# Patient Record
Sex: MALE | Race: WHITE | Employment: OTHER | URBAN - METROPOLITAN AREA
[De-identification: names, ages, dates, MRNs, and addresses within clinical notes are randomized per-mention and may not be internally consistent; named-entity substitution may affect disease eponyms.]

---

## 2019-06-22 ENCOUNTER — APPOINTMENT (OUTPATIENT)
Dept: GENERAL RADIOLOGY | Age: 29
End: 2019-06-22
Attending: EMERGENCY MEDICINE
Payer: OTHER GOVERNMENT

## 2019-06-22 ENCOUNTER — HOSPITAL ENCOUNTER (EMERGENCY)
Age: 29
Discharge: HOME | End: 2019-06-22
Attending: EMERGENCY MEDICINE
Payer: OTHER GOVERNMENT

## 2019-06-22 VITALS
OXYGEN SATURATION: 100 % | SYSTOLIC BLOOD PRESSURE: 117 MMHG | RESPIRATION RATE: 18 BRPM | TEMPERATURE: 97.8 F | DIASTOLIC BLOOD PRESSURE: 80 MMHG | WEIGHT: 250 LBS | HEART RATE: 121 BPM | HEIGHT: 76 IN | BODY MASS INDEX: 30.44 KG/M2

## 2019-06-22 DIAGNOSIS — S42.031A CLOSED DISPLACED FRACTURE OF ACROMIAL END OF RIGHT CLAVICLE, INITIAL ENCOUNTER: Primary | ICD-10-CM

## 2019-06-22 PROCEDURE — 73030 X-RAY EXAM OF SHOULDER: CPT

## 2019-06-22 PROCEDURE — 74011250637 HC RX REV CODE- 250/637: Performed by: EMERGENCY MEDICINE

## 2019-06-22 PROCEDURE — 99283 EMERGENCY DEPT VISIT LOW MDM: CPT

## 2019-06-22 RX ORDER — OXYCODONE AND ACETAMINOPHEN 5; 325 MG/1; MG/1
1 TABLET ORAL
Qty: 12 TAB | Refills: 0 | Status: SHIPPED | OUTPATIENT
Start: 2019-06-22 | End: 2019-06-27

## 2019-06-22 RX ORDER — DIAZEPAM 10 MG/1
5 TABLET ORAL
COMMUNITY

## 2019-06-22 RX ORDER — OXYCODONE AND ACETAMINOPHEN 5; 325 MG/1; MG/1
1 TABLET ORAL ONCE
Status: COMPLETED | OUTPATIENT
Start: 2019-06-22 | End: 2019-06-22

## 2019-06-22 RX ORDER — BENAZEPRIL HYDROCHLORIDE AND HYDROCHLOROTHIAZIDE 10; 12.5 MG/1; MG/1
TABLET ORAL DAILY
COMMUNITY

## 2019-06-22 RX ADMIN — OXYCODONE HYDROCHLORIDE AND ACETAMINOPHEN 1 TABLET: 5; 325 TABLET ORAL at 08:08

## 2019-06-22 NOTE — ED PROVIDER NOTES
EMERGENCY DEPARTMENT HISTORY AND PHYSICAL EXAM    Date: 6/22/2019  Patient Name: Louise Bustamante    History of Presenting Illness     Chief Complaint   Patient presents with   [facility] Shoulder Injury         History Provided By: Patient    7:46 AM  Louise Bustamante is a 30 y.o. male with PMHX of hypertension who presents to the emergency department C/O fall on outstretched hand on the right. He states he felt a pop in his right shoulder. No other injuries. PCP: Xi, MD Kosta    Current Outpatient Medications   Medication Sig Dispense Refill    benazepril-hydroCHLOROthiazide (LOTENSIN HCT) 10-12.5 mg per tablet Take  by mouth daily.  diazePAM (VALIUM) 10 mg tablet Take 5 mg by mouth every six (6) hours as needed for Anxiety.  oxyCODONE-acetaminophen (PERCOCET) 5-325 mg per tablet Take 1 Tab by mouth every six (6) hours as needed for Pain for up to 5 days. Max Daily Amount: 4 Tabs. 12 Tab 0       Past History     Past Medical History:  Past Medical History:   Diagnosis Date    Hypertension     Psychiatric disorder        Past Surgical History:  History reviewed. No pertinent surgical history. Family History:  History reviewed. No pertinent family history. Social History:  Social History     Tobacco Use    Smoking status: Never Smoker    Smokeless tobacco: Never Used   Substance Use Topics    Alcohol use: Yes    Drug use: Not on file       Allergies:  No Known Allergies      Review of Systems   Review of Systems   Constitutional: Negative for fever. HENT: Negative for ear pain and hearing loss. Eyes: Negative for pain and visual disturbance. Respiratory: Negative for shortness of breath. Cardiovascular: Negative for chest pain. Gastrointestinal: Negative for abdominal pain. Genitourinary: Negative for difficulty urinating and dysuria. Musculoskeletal:        Right shoulder pain    Neurological: Negative for dizziness, light-headedness and headaches.        Physical Exam Vitals:    06/22/19 0635   BP: 117/80   Pulse: (!) 121   Resp: 18   Temp: 97.8 °F (36.6 °C)   SpO2: 100%   Weight: 113.4 kg (250 lb)   Height: 6' 4\" (1.93 m)     Physical Exam   Constitutional: He is oriented to person, place, and time. He appears well-developed. HENT:   Head: Normocephalic and atraumatic. Eyes: Pupils are equal, round, and reactive to light. Neck: Normal range of motion. Cardiovascular: Normal rate and regular rhythm. Pulmonary/Chest: Effort normal and breath sounds normal.   Abdominal: Soft. Musculoskeletal:        Right shoulder: He exhibits decreased range of motion. He exhibits no pain and normal pulse. Right elbow: Normal.       Right wrist: Normal.        Arms:  Neurological: He is alert and oriented to person, place, and time. Psychiatric: He has a normal mood and affect. Nursing note and vitals reviewed. Diagnostic Study Results     Labs -   No results found for this or any previous visit (from the past 12 hour(s)). Radiologic Studies - displaced fracture of collar bone distally  XR SHOULDER RT AP/LAT MIN 2 V    (Results Pending)     CT Results  (Last 48 hours)    None        CXR Results  (Last 48 hours)    None          Medications given in the ED-  Medications   oxyCODONE-acetaminophen (PERCOCET) 5-325 mg per tablet 1 Tab (1 Tab Oral Given 6/22/19 0808)         Medical Decision Making   I am the first provider for this patient. I reviewed the vital signs, available nursing notes, past medical history, past surgical history, family history and social history. Vital Signs-Reviewed the patient's vital signs. Records Reviewed: Nursing Notes       Provider Notes (Medical Decision Making): Pedrito Mcgregor is a 30 y.o. male ending after a fall on outstretched hand. He on x-ray has a displaced distal collarbone fracture. His ulnar radial and medial nerves as well as axillary nerves are intact.   He has decreased range of motion of the right shoulder but no signs of shoulder dislocation. Orthopedics was consulted. And pain medication was given. Procedures:  Procedures    ED Course:   Talk to Dr. Krysta Hook who agrees the patient should have a sling and will follow-up next week. Diagnosis and Disposition       DISCHARGE NOTE:    Chance Dougherty  results have been reviewed with him. He has been counseled regarding his diagnosis, treatment, and plan. He verbally conveys understanding and agreement of the signs, symptoms, diagnosis, treatment and prognosis and additionally agrees to follow up as discussed. He also agrees with the care-plan and conveys that all of his questions have been answered. I have also provided discharge instructions for him that include: educational information regarding their diagnosis and treatment, and list of reasons why they would want to return to the ED prior to their follow-up appointment, should his condition change. He has been provided with education for proper emergency department utilization. CLINICAL IMPRESSION:    1. Closed displaced fracture of acromial end of right clavicle, initial encounter        PLAN:  1. D/C Home  2. Current Discharge Medication List      START taking these medications    Details   oxyCODONE-acetaminophen (PERCOCET) 5-325 mg per tablet Take 1 Tab by mouth every six (6) hours as needed for Pain for up to 5 days. Max Daily Amount: 4 Tabs. Qty: 12 Tab, Refills: 0    Associated Diagnoses: Closed displaced fracture of acromial end of right clavicle, initial encounter           3.    Follow-up Information     Follow up With Specialties Details Why Contact Info    Lolis Scott MD Orthopedic Surgery  call monday for follow up with the orthopedist, wear your sling until  then  [address] [id number]      [facility] EMERGENCY DEPT Emergency Medicine  As needed, If symptoms worsen [address] [id number]  [phone] _______________________________      Please note that this dictation was completed with [employer], the computer voice recognition software. Quite often unanticipated grammatical, syntax, homophones, and other interpretive errors are inadvertently transcribed by the computer software. Please disregard these errors. Please excuse any errors that have escaped final proofreading.

## 2019-06-22 NOTE — DISCHARGE INSTRUCTIONS
Patient Education        Broken Collarbone: Care Instructions  Your Care Instructions    You have broken or cracked your collarbone, or clavicle. The collarbone is the long, slightly curved bone that connects the shoulder to the chest. It supports the shoulder. A broken collarbone may take 6 weeks or longer to heal. You will need to wear an arm sling to keep the broken bone from moving while it heals. At first, it may hurt to move your arm. This will get better with time. You heal best when you take good care of yourself. Eat a variety of healthy foods, and don't smoke. Follow-up care is a key part of your treatment and safety. Be sure to make and go to all appointments, and call your doctor if you are having problems. It's also a good idea to know your test results and keep a list of the medicines you take. How can you care for yourself at home? · Wear the sling day and night for as long as your doctor tells you to. You may take off the sling when you bathe. When the sling is off, avoid arm positions or motions that cause or increase pain. · Put ice or a cold pack on your collarbone for 10 to 20 minutes at a time. Try to do this every 1 to 2 hours for the next 3 days (when you are awake) or until the swelling goes down. Put a thin cloth between the ice and your skin. · Be safe with medicines. Take pain medicines exactly as directed. ? If the doctor gave you a prescription medicine for pain, take it as prescribed. ? If you are not taking a prescription pain medicine, ask your doctor if you can take an over-the-counter medicine. ? Do not take two or more pain medicines at the same time unless the doctor told you to. Many pain medicines have acetaminophen, which is Tylenol. Too much acetaminophen (Tylenol) can be harmful. · Try sleeping with pillows propped under your arm for comfort. · After a few days, put your fingers, wrist, and elbow through their full range of motion several times a day.  This will keep them from getting stiff. You may get instructions on rehabilitation exercises you can do when your shoulder starts to heal.  · You may use warm packs after the first 3 days for 15 to 20 minutes at a time to ease pain. · You may notice a bump where the collarbone is broken. Over time, the bump will get smaller. A small bump may remain, but it should not affect your arm's strength or movement. When should you call for help? Call your doctor now or seek immediate medical care if:    · Your fingers become numb, tingly, cool, or pale.     · You cannot move your arm.    Watch closely for changes in your health, and be sure to contact your doctor if:    · You have new or increased pain.     · You have new or increased swelling.     · You do not get better as expected. Where can you learn more? Go to http://cristal-erasto.info/. Enter P186 in the search box to learn more about \"Broken Collarbone: Care Instructions. \"  Current as of: September 20, 2018  Content Version: 11.9  © 1167-5482 [employer]. Care instructions adapted under license by [employer] (which disclaims liability or warranty for this information). If you have questions about a medical condition or this instruction, always ask your healthcare professional. [address] any warranty or liability for your use of this information.

## 2019-10-07 NOTE — ED TRIAGE NOTES
Arrives ambulatory to the ed with c/o right shoulder pain s/p fall. Pt says he tried to \"catch himself\" with his right hand and heard a pop. Referred by: Mani Romero MD; Medical Diagnosis (from order):    Diagnosis Information      Diagnosis    721.0 (ICD-9-CM) - M47.812 (ICD-10-CM) - Spondylosis of cervical region without myelopathy or radiculopathy                Physical Therapy -  Daily Treatment Note    Visit:  1   SUBJECTIVE                                                                                                             Visit 2.  Medicare Cert expires 12/2/2019.    Some increase in R upper cervical pain since last session.  Reports more anxiety today, thought to be related to the stress of spouse's passing.   Current functional limitations: AROM cervical spine :  L ROT 57 degrees  R ROT 0-60  SB L = 33  SB R = 20   C1 lateral ly deviated L           OBJECTIVE                                                                                                                           TREATMENT                                                                                                                     Therapeutic Exercise:  Discussion and demonstration of diaphragmatic breathing.  Review of upper cervical flexion AROM  Manual Therapy:  C1  Lateral glide  Manual traction C spine  Passive lateral glide cervical spine  Sub-occipital release.      ASSESSMENT                                                                                                                     L ROT =  51, after treatment = 55  R ROT = 64, after treatment = 68  Patient Education:   Results of above outlined education: Verbalizes understanding and Demonstrates understanding    PLAN                                                                                                                                     Home Exercise Program: Independent with progressed and modified home exercise program (HEP)    Patient to be independent with an evolving HEP   ADLs:  Patient to prepare a meal (an activity which requires prolonged cervical FB) with no difficulty    Mobility:  Patient to rotate cervical spine 70 degrees to the R and L to facilitate driving safety.         Procedures and total treatment time documented Time Entry flowsheet.